# Patient Record
Sex: FEMALE | URBAN - METROPOLITAN AREA
[De-identification: names, ages, dates, MRNs, and addresses within clinical notes are randomized per-mention and may not be internally consistent; named-entity substitution may affect disease eponyms.]

---

## 2017-01-18 NOTE — PATIENT DISCUSSION
(Z96.1) Presence of intraocular lens - Assesment : Patient is Pseudophakic. ORA done in OR during surgery IOP OS SLIGHTLY ELEVATED  1 GTT TRAVATAN IN OFFICE @ 8:49 LW - Plan : Discussed signs and symptoms of infection and retinal detachments. Do not rub operated eye. Follow drop schedule If redness,pain,decreased vision, flashes or floaters occur then contact clinic.

## 2017-01-23 NOTE — PATIENT DISCUSSION
(Z96.1) Presence of intraocular lens - Assesment : Patient is Pseudophakic. - Plan : Signs and symptoms of infection and retinal detachment are outlined in your surgical packet. Do not rub operated eye. Follow drop schedule. If redness, pain, decreased vision, flashes or floaters occur then contact clinic.

## 2017-01-23 NOTE — PATIENT DISCUSSION
(H25.11) Age-related nuclear cataract, right eye - Assesment : Examination revealed cataract. Examination reveals significant senile nucleur cataract, patient is symptomatic with visual function affected. Astigmatism present in both eyes recommend astigmatism correcting lens to correct the astigmatism if she wants less dependency on glasses,if doesn't proceed with toric then she'll have to wear glasses full-time after CE. Advised patient she'll will notice more improvement after Ce more on OS than OD secondary to WET amd OD. - Plan : Risks, Benefits and Alternatives were discussed with patient at length for Cataract Surgery. Visual symptoms are consistent with Cataract findings on examination and current refraction no longer provides satisfactory vision. Patient understands and desires surgery. All questions answered. Risks, Benefits and Alternatives discussed at length for IOL placement. Doctor suggests [place]. Patient TBD. Patient will need to wear glasses for best corrected vision for distance, near and to correct astigmatism if no toric/if toric reading glasses.   EYE:OD IOL TYPE: Toric POST OPERATIVE TARGET: [employer] PACKAGE: Toric

## 2017-02-01 NOTE — PATIENT DISCUSSION
(Z96.1) Presence of intraocular lens - Assesment : Patient is Pseudophakic. ORA was done in OR on operated eye. IOP OD ELEVATED TODAY  1 GTT TRAVATAN  @ 1:36  OD -LW - Plan : Discussed signs and symptoms of infection and retinal detachments. Do not rub operated eye. Follow drop schedule If redness,pain,decreased vision, flashes or floaters occur then contact clinic. START TRAVATAN OD QD X 4 DAYS THEN STOP -SAMPLE TO PT. POST OP GTT INSTRUCTIONS DISCUSSED WITH DEMI BEFORE PATIENT LEFT TODAY.

## 2017-02-07 NOTE — PATIENT DISCUSSION
(Z96.1) Presence of intraocular lens - Assesment : Patient is Pseudophakic. LENS EPITHELIAL CELL - Plan : Signs and symptoms of infection and retinal detachment are outlined in your surgical packet. Do not rub operated eye. Follow drop schedule. If redness, pain, decreased vision, flashes or floaters occur then contact clinic.  **JRB TO CONTINUE DUREZOL BEYOND GTT SCHEDULE.  1 WEEK /REFRACT/  DILATE OU

## 2017-02-14 NOTE — PATIENT DISCUSSION
(Z96.1) Presence of intraocular lens - Assesment : Patient is Pseudophakic. - Plan : Signs and symptoms of infection and retinal detachment are outlined in your surgical packet. Do not rub operated eye. Follow drop schedule. If redness, pain, decreased vision, flashes or floaters occur then contact clinic. CONTINUE PROLENSA OR ILEVRO OU QD TILL NEXT VISIT.  -SAMPLES TO PT TODAY  1 MONTH/ REFRACT/ MAC OCT PER WILLIAM

## 2017-03-17 NOTE — PATIENT DISCUSSION
(X66.1237) Nexdtve age-related mclr degn left eye intermed dry stage - Assesment : Examination revealed AMD Dry PATIENT HAS APPT WITH DR. Chris Galvan NEXT WEEK - Plan : Monitor for changes. Advised patient to call our office with decreased vision or increased distortion. Patient advised to check [employer] regularly (once weekly or more) and use nutraceuticals such as AREDS 2 eye vitamins. Wear sunglasses when outdoors and eat green, leafy vegetables to maintain ocular health.

## 2017-03-17 NOTE — PATIENT DISCUSSION
(C97.3727) Exdtve age-rel mclr degn right eye with inact chrdl neovas - Assesment : Examination revealed AMD Wet. PATIENT HAS APPT WITH DR. Brunilda Anguiano NEXT WEEK. - Plan : Monitor for changes. Advised patient to call our office with decreased vision or increased distortion. FOLLOW UP WITH DR. Brunilda Anguiano AS SCHEDULED.

## 2017-03-17 NOTE — PATIENT DISCUSSION
(D02.5857) Primary open-angle glaucoma bilateral mild stage - Assesment : Examination revealed primary open angle glaucoma. -MILD - Plan : Monitor for changes. Continue Latanoprost OU qhs.  2 MONTH/ 24-2/ TENSION CHECK WITH DR. PARMAR

## 2017-03-17 NOTE — PATIENT DISCUSSION
(Z96.1) Presence of intraocular lens - Assesment : Patient is Pseudophakic. - Plan : Signs and symptoms of infection and retinal detachment are outlined in your surgical packet. Do not rub operated eye. Follow drop schedule. If redness, pain, decreased vision, flashes or floaters occur then contact clinic. USE PROLENSA OU QD TILL BOTTLE IS GONE.

## 2017-05-19 NOTE — PATIENT DISCUSSION
(V82.988) Keratoconjunct sicca, not specified as Sjogren's, bilateral - Assesment : Examination revealed Dry Eye Syndrome - Plan : Monitor for changes. ATs daily 2-3 times and prn.

## 2017-05-19 NOTE — PATIENT DISCUSSION
(H42.2565) Primary open-angle glaucoma bilateral mild stage - Assesment : Examination revealed primary open angle glaucoma. HVF today. - Plan : Monitor for IOP and nfl changes with visits and testing. Continue Latanoprost OU qhs. E-Rx with refills sent to pharmacy. Continue following with Dr. Randall Rogers as scheduled for Retina. RTC in 6 months for Exam and OCT ONH, sooner if problems or changes occur.

## 2017-11-16 NOTE — PATIENT DISCUSSION
(O92.6933) Primary open-angle glaucoma bilateral mild stage - Assesment : Examination revealed primary open angle glaucoma. OCT ONH stable today. - Plan : Monitor for IOP and NFL changes with visits and testing. Continue Latanoprost OU qhs. Continue following with Dr. Joey Galeano as scheduled for Retina. RTC in 6 months for IOP Check and HVF, sooner if problems or changes occur.

## 2017-11-16 NOTE — PATIENT DISCUSSION
(H41.384) Vitreous degeneration, left eye - Assesment : Examination revealed PVD OS. No changes reported. No holes or tears noted today. - Plan : Monitor for changes. Advised patient to call our office with decreased vision or an increase in flashes and/or floaters.

## 2017-11-16 NOTE — PATIENT DISCUSSION
(R50.6455) Exdtve age-rel mclr degn right eye with inact chrdl neovas - Assesment : Examination revealed AMD Wet. - Plan : Monitor for changes. Continue following with Dr. Joey Galeano as scheduled.

## 2017-11-16 NOTE — PATIENT DISCUSSION
(N97.365) Keratoconjunct sicca, not specified as Sjogren's, bilateral - Assesment : Examination revealed Dry Eye Syndrome - Plan : Monitor for changes. ATs daily 2-3 times and prn.

## 2018-02-08 ENCOUNTER — IMPORTED ENCOUNTER (OUTPATIENT)
Dept: URBAN - METROPOLITAN AREA CLINIC 9 | Facility: CLINIC | Age: 53
End: 2018-02-08

## 2019-12-10 ENCOUNTER — APPOINTMENT (OUTPATIENT)
Dept: CT IMAGING | Age: 54
End: 2019-12-10
Attending: EMERGENCY MEDICINE
Payer: COMMERCIAL

## 2019-12-10 ENCOUNTER — HOSPITAL ENCOUNTER (EMERGENCY)
Age: 54
Discharge: HOME | End: 2019-12-10
Attending: EMERGENCY MEDICINE
Payer: COMMERCIAL

## 2019-12-10 VITALS
DIASTOLIC BLOOD PRESSURE: 91 MMHG | SYSTOLIC BLOOD PRESSURE: 127 MMHG | RESPIRATION RATE: 14 BRPM | WEIGHT: 144.4 LBS | HEIGHT: 68 IN | BODY MASS INDEX: 21.89 KG/M2 | OXYGEN SATURATION: 98 % | HEART RATE: 64 BPM | TEMPERATURE: 97.3 F

## 2019-12-10 DIAGNOSIS — M47.812 CERVICAL SPONDYLOSIS: Primary | ICD-10-CM

## 2019-12-10 PROCEDURE — 72125 CT NECK SPINE W/O DYE: CPT

## 2019-12-10 PROCEDURE — 99284 EMERGENCY DEPT VISIT MOD MDM: CPT

## 2019-12-10 PROCEDURE — 72128 CT CHEST SPINE W/O DYE: CPT

## 2019-12-10 PROCEDURE — 74011250637 HC RX REV CODE- 250/637: Performed by: EMERGENCY MEDICINE

## 2019-12-10 PROCEDURE — 93005 ELECTROCARDIOGRAM TRACING: CPT

## 2019-12-10 RX ORDER — PREDNISONE 20 MG/1
20 TABLET ORAL
COMMUNITY

## 2019-12-10 RX ORDER — SPIRONOLACTONE 50 MG/1
50 TABLET, FILM COATED ORAL DAILY
COMMUNITY

## 2019-12-10 RX ORDER — HYDROCODONE BITARTRATE AND ACETAMINOPHEN 5; 325 MG/1; MG/1
1 TABLET ORAL
Status: COMPLETED | OUTPATIENT
Start: 2019-12-10 | End: 2019-12-10

## 2019-12-10 RX ORDER — CYCLOBENZAPRINE HCL 5 MG
5 TABLET ORAL
Qty: 15 TAB | Refills: 0 | Status: SHIPPED | OUTPATIENT
Start: 2019-12-10

## 2019-12-10 RX ORDER — LEVOTHYROXINE SODIUM 50 UG/1
50 TABLET ORAL
COMMUNITY

## 2019-12-10 RX ORDER — GABAPENTIN 100 MG/1
100 CAPSULE ORAL 3 TIMES DAILY
Qty: 20 CAP | Refills: 0 | Status: SHIPPED | OUTPATIENT
Start: 2019-12-10

## 2019-12-10 RX ORDER — LORATADINE 10 MG/1
10 TABLET ORAL
COMMUNITY

## 2019-12-10 RX ORDER — DIAZEPAM 5 MG/1
5 TABLET ORAL ONCE
Status: COMPLETED | OUTPATIENT
Start: 2019-12-10 | End: 2019-12-10

## 2019-12-10 RX ORDER — ESOMEPRAZOLE MAGNESIUM 40 MG/1
40 CAPSULE, DELAYED RELEASE ORAL DAILY
COMMUNITY

## 2019-12-10 RX ORDER — FLUTICASONE PROPIONATE 50 MCG
2 SPRAY, SUSPENSION (ML) NASAL DAILY
COMMUNITY

## 2019-12-10 RX ADMIN — DIAZEPAM 5 MG: 5 TABLET ORAL at 18:20

## 2019-12-10 RX ADMIN — HYDROCODONE BITARTRATE AND ACETAMINOPHEN 1 TABLET: 5; 325 TABLET ORAL at 19:26

## 2019-12-10 NOTE — ED PROVIDER NOTES
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 She denies actual pain in the neck but the pain in the paraspinal area around the T-spine makes it very difficult for her to rotate her head. She has no wrist drop or decreased in motor function. Past Medical History:   Diagnosis Date    Adrenal insufficiency (HCC)     Allergic rhinitis     Arthritis     Asthma     Diverticulitis     Elevated liver enzymes     Hypercholesteremia     Hypothyroidism     Migraines     Prediabetes     Psoriatic arthritis ([facility] [address]        Past Surgical History:   Procedure Laterality Date    HX CATARACT REMOVAL      HX GI      sigmoid resection    HX HERNIA REPAIR      HX HYSTERECTOMY      HX SACROCOLPOPEXY      HX WISDOM TEETH EXTRACTION      VASCULAR SURGERY PROCEDURE UNLIST      left greater saphenous vein closure         History reviewed. No pertinent family history.     Social History     Socioeconomic History    Marital status: Not on file     Spouse name: Not on file    Number of children: Not on file    Years of education: Not on file    Highest education level: Not on file   Occupational History    Not on file   Social Needs    Financial resource strain: Not on file    Food insecurity:     Worry: Not on file     Inability: Not on file    Transportation needs:     Medical: Not on file     Non-medical: Not on file   Tobacco Use    Smoking status: Never Smoker    Smokeless tobacco: Never Used   Substance and Sexual Activity    Alcohol use: Not Currently    Drug use: Never    Sexual activity: Not on file   Lifestyle    Physical activity:     Days per week: Not on file     Minutes per session: Not on file    Stress: Not on file Relationships    Social connections:     Talks on phone: Not on file     Gets together: Not on file     Attends Bahai service: Not on file     Active member of club or organization: Not on file     Attends meetings of clubs or organizations: Not on file     Relationship status: Not on file    Intimate partner violence:     Fear of current or ex partner: Not on file     Emotionally abused: Not on file     Physically abused: Not on file     Forced sexual activity: Not on file   Other Topics Concern    Not on file   Social History Narrative    Not on file         ALLERGIES: Pcn [penicillins]    Review of Systems   All other systems reviewed and are negative. Vitals:    12/10/19 1731   BP: (!) 122/94   Pulse: 86   Resp: 16   Temp: 98.5 °F (36.9 °C)   SpO2: 97%   Weight: 65.5 kg (144 lb 6.4 oz)   Height: 5' 8\" (1.727 m)            Physical Exam  Vitals signs and nursing note reviewed. Constitutional:       Appearance: She is well-developed. HENT:      Head: Normocephalic and atraumatic. Right Ear: Tympanic membrane normal.      Mouth/Throat:      Pharynx: No oropharyngeal exudate. Eyes:      General: No scleral icterus. Conjunctiva/sclera: Conjunctivae normal.   Neck:      Musculoskeletal: Neck supple. Thyroid: No thyromegaly. Cardiovascular:      Rate and Rhythm: Normal rate and regular rhythm. Heart sounds: Normal heart sounds. No murmur. No friction rub. No gallop. Pulmonary:      Effort: Pulmonary effort is normal. No respiratory distress. Breath sounds: Normal breath sounds. No stridor. No wheezing or rales. Abdominal:      General: Bowel sounds are normal.      Palpations: Abdomen is soft. Tenderness: There is no tenderness. There is no guarding or rebound. Musculoskeletal: Normal range of motion. Lymphadenopathy:      Cervical: No cervical adenopathy. Skin:     General: Skin is warm and dry.    Neurological:      Mental Status: She is alert and oriented to person, place, and time. MDM  Number of Diagnoses or Management Options  Cervical spondylosis:      Amount and/or Complexity of Data Reviewed  Tests in the radiology section of CPT®: ordered and reviewed  Obtain history from someone other than the patient: yes           Procedures        Assessment and plan    the patient has cervical spondylosis especially at C5-6 worse on the left but no acute herniated disc. I believe this is accounting for her left arm pain and she probably has some spasm in the paraspinal area of the T-spine. T-spine CT scan revealed no acute findings. I will treat the patient with Neurontin and Flexeril and have asked her to increase her prednisone to 60 mg a day for a few days with close follow-up by Dr. Alan Boyle. a the patient may need an MR scan in the future.

## 2019-12-10 NOTE — ED TRIAGE NOTES
Pt states that she has psoriatic arthritis and today her pain in her neck, left arm, and left shoulder. Per  pt has pin-point tenderness in her back and patient states it hurts to move.

## 2019-12-11 LAB
ATRIAL RATE: 74 BPM
CALCULATED P AXIS, ECG09: 42 DEGREES
CALCULATED R AXIS, ECG10: 29 DEGREES
CALCULATED T AXIS, ECG11: 42 DEGREES
DIAGNOSIS, 93000: NORMAL
P-R INTERVAL, ECG05: 136 MS
Q-T INTERVAL, ECG07: 362 MS
QRS DURATION, ECG06: 84 MS
QTC CALCULATION (BEZET), ECG08: 401 MS
VENTRICULAR RATE, ECG03: 74 BPM

## 2019-12-11 NOTE — DISCHARGE INSTRUCTIONS
Patient Education        Cervical Spondylosis: Care Instructions  Your Care Instructions    Cervical spondylosis is a type of arthritis of the neck. It can happen as people get older. It may be caused by bone spurs or other problems. You may have neck pain and stiffness. Sometimes the space around the spinal cord narrows. When this happens, it is called spinal stenosis. Spinal stenosis can cause pain, numbness, or weakness in the arms, legs, feet, and rear end (buttocks). It can also cause loss of bowel and bladder control. You can treat some of your symptoms with over-the-counter pain medicine. But if you have spinal stenosis with severe symptoms, you may need surgery. Follow-up care is a key part of your treatment and safety. Be sure to make and go to all appointments, and call your doctor if you are having problems. It's also a good idea to know your test results and keep a list of the medicines you take. How can you care for yourself at home? · Take anti-inflammatory medicines to reduce neck pain. These include ibuprofen (Advil, Motrin) and naproxen (Aleve). Be safe with medicines. Read and follow all instructions on the label. · Follow your doctor's recommendation about activity. He or she may tell you not to do sports or activities that could injure your neck. When should you call for help? Call 911 anytime you think you may need emergency care. For example, call if:    · You are unable to move an arm or a leg at all.   [facility] your doctor now or seek immediate medical care if:    · You have new or worse symptoms in your arms, legs, belly, or buttocks. Symptoms may include:  ? Numbness or tingling. ? Weakness. ? Pain.     · You lose bladder or bowel control.    Watch closely for changes in your health, and be sure to contact your doctor if:    · You do not get better as expected. Where can you learn more? Go to http://sneha-neil.info/.   Enter G782 in the search box to learn more about \"Cervical Spondylosis: Care Instructions. \"  Current as of: June 26, 2019  Content Version: 12.2  © 5098-6495 [employer]. Care instructions adapted under license by [employer] (which disclaims liability or warranty for this information). If you have questions about a medical condition or this instruction, always ask your healthcare professional. [address] any warranty or liability for your use of this information.

## 2019-12-11 NOTE — ED NOTES
Patient given copy of dc instructions and 2 paper script. Patient  verbalized understanding of instructions and script. Patient given a current medication reconciliation form and verbalized understanding of their medications. Patientverbalized understanding of the importance of discussing medications with  his or her physician or clinic they will be following up with. Patient alert and oriented and in no acute distress. Patient ambulatory to exit with family member.

## 2021-03-23 ENCOUNTER — IMPORTED ENCOUNTER (OUTPATIENT)
Dept: URBAN - METROPOLITAN AREA CLINIC 9 | Facility: CLINIC | Age: 56
End: 2021-03-23

## 2021-03-23 PROBLEM — H04.123: Noted: 2021-03-23

## 2021-03-23 PROBLEM — E11.9: Noted: 2021-03-23

## 2021-03-23 PROBLEM — H52.223: Noted: 2021-03-23

## 2021-03-23 PROBLEM — H35.373: Noted: 2021-03-23

## 2021-03-23 PROBLEM — M35.00: Noted: 2021-03-23

## 2021-03-23 PROBLEM — H43.813: Noted: 2021-03-23

## 2021-03-23 PROBLEM — H11.153: Noted: 2021-03-23

## 2021-03-23 PROBLEM — H35.371: Noted: 2021-03-23

## 2021-10-16 ASSESSMENT — TONOMETRY
OD_IOP_MMHG: 16
OD_IOP_MMHG: 15
OS_IOP_MMHG: 17
OS_IOP_MMHG: 18

## 2021-10-16 ASSESSMENT — VISUAL ACUITY
OS_CC: 20/25 SN
OS_CC: 20/30 -2 SN
OD_CC: 20/20 SN
OD_CC: 20/20 SN
OS_CC: 20/20 -2 SN
OS_SC: 20/200 SN
OD_SC: 20/40 - SN

## 2021-10-16 ASSESSMENT — KERATOMETRY
OS_K1POWER_DIOPTERS: 44.75
OD_AXISANGLE2_DEGREES: 117
OS_K2POWER_DIOPTERS: 41.75
OS_AXISANGLE2_DEGREES: 56
OS_AXISANGLE_DEGREES: 146
OD_K2POWER_DIOPTERS: 43
OD_K1POWER_DIOPTERS: 43.5
OD_AXISANGLE_DEGREES: 27

## 2022-11-14 ENCOUNTER — ESTABLISHED PATIENT (OUTPATIENT)
Dept: URBAN - METROPOLITAN AREA CLINIC 4 | Facility: CLINIC | Age: 57
End: 2022-11-14

## 2022-11-14 DIAGNOSIS — H35.371: ICD-10-CM

## 2022-11-14 DIAGNOSIS — H43.813: ICD-10-CM

## 2022-11-14 DIAGNOSIS — E11.9: ICD-10-CM

## 2022-11-14 PROCEDURE — 92014 COMPRE OPH EXAM EST PT 1/>: CPT

## 2022-11-14 PROCEDURE — 92134 CPTRZ OPH DX IMG PST SGM RTA: CPT

## 2022-11-14 ASSESSMENT — TONOMETRY
OS_IOP_MMHG: 28
OD_IOP_MMHG: 17

## 2022-11-14 ASSESSMENT — VISUAL ACUITY
OS_CC: 20/25+1
OD_CC: 20/20

## 2022-12-05 ENCOUNTER — ESTABLISHED PATIENT (OUTPATIENT)
Dept: URBAN - METROPOLITAN AREA CLINIC 17 | Facility: CLINIC | Age: 57
End: 2022-12-05

## 2022-12-05 PROCEDURE — 92134 CPTRZ OPH DX IMG PST SGM RTA: CPT

## 2022-12-05 PROCEDURE — 99214 OFFICE O/P EST MOD 30 MIN: CPT

## 2022-12-05 RX ORDER — NEOMYCIN SULFATE, POLYMYXIN B SULFATE AND DEXAMETHASONE 3.5; 10000; 1 MG/G; [USP'U]/G; MG/G: OINTMENT OPHTHALMIC EVERY EVENING

## 2022-12-05 ASSESSMENT — VISUAL ACUITY
OD_CC: 20/30-1
OS_CC: 20/25-1

## 2022-12-05 ASSESSMENT — TONOMETRY
OD_IOP_MMHG: 18
OS_IOP_MMHG: 21

## 2023-04-12 ENCOUNTER — ESTABLISHED PATIENT (OUTPATIENT)
Dept: URBAN - METROPOLITAN AREA CLINIC 17 | Facility: CLINIC | Age: 58
End: 2023-04-12

## 2023-04-12 DIAGNOSIS — H04.123: ICD-10-CM

## 2023-04-12 DIAGNOSIS — H40.1111: ICD-10-CM

## 2023-04-12 PROCEDURE — 99213 OFFICE O/P EST LOW 20 MIN: CPT

## 2023-04-12 ASSESSMENT — VISUAL ACUITY
OD_CC: 20/50-2
OS_CC: 20/20

## 2023-04-12 ASSESSMENT — TONOMETRY
OS_IOP_MMHG: 16
OD_IOP_MMHG: 14

## 2023-08-02 ENCOUNTER — FOLLOW UP (OUTPATIENT)
Dept: URBAN - METROPOLITAN AREA CLINIC 17 | Facility: CLINIC | Age: 58
End: 2023-08-02

## 2023-08-02 DIAGNOSIS — M35.00: ICD-10-CM

## 2023-08-02 DIAGNOSIS — H40.1111: ICD-10-CM

## 2023-08-02 PROCEDURE — 99213 OFFICE O/P EST LOW 20 MIN: CPT

## 2023-08-02 PROCEDURE — 92133 CPTRZD OPH DX IMG PST SGM ON: CPT

## 2023-08-02 ASSESSMENT — TONOMETRY
OS_IOP_MMHG: 18
OD_IOP_MMHG: 14

## 2023-08-02 ASSESSMENT — VISUAL ACUITY
OD_CC: 20/80+1
OS_CC: 20/20-2

## 2023-09-26 ENCOUNTER — ESTABLISHED PATIENT (OUTPATIENT)
Dept: URBAN - METROPOLITAN AREA CLINIC 18 | Facility: CLINIC | Age: 58
End: 2023-09-26

## 2023-09-26 DIAGNOSIS — H35.373: ICD-10-CM

## 2023-09-26 DIAGNOSIS — H40.1111: ICD-10-CM

## 2023-09-26 DIAGNOSIS — E11.9: ICD-10-CM

## 2023-09-26 DIAGNOSIS — H43.813: ICD-10-CM

## 2023-09-26 PROCEDURE — 92014 COMPRE OPH EXAM EST PT 1/>: CPT

## 2023-09-26 PROCEDURE — 92201 OPSCPY EXTND RTA DRAW UNI/BI: CPT

## 2023-09-26 PROCEDURE — 92134 CPTRZ OPH DX IMG PST SGM RTA: CPT

## 2023-09-26 ASSESSMENT — VISUAL ACUITY
OD_CC: 20/70-2
OS_CC: 20/20-2

## 2023-09-26 ASSESSMENT — TONOMETRY
OD_IOP_MMHG: 13
OS_IOP_MMHG: 14

## 2024-02-21 ENCOUNTER — ESTABLISHED PATIENT (OUTPATIENT)
Dept: URBAN - METROPOLITAN AREA CLINIC 17 | Facility: CLINIC | Age: 59
End: 2024-02-21

## 2024-02-21 DIAGNOSIS — E11.9: ICD-10-CM

## 2024-02-21 DIAGNOSIS — H43.813: ICD-10-CM

## 2024-02-21 DIAGNOSIS — H35.373: ICD-10-CM

## 2024-02-21 DIAGNOSIS — H52.4: ICD-10-CM

## 2024-02-21 DIAGNOSIS — H40.1111: ICD-10-CM

## 2024-02-21 DIAGNOSIS — H16.223: ICD-10-CM

## 2024-02-21 PROCEDURE — 92015 DETERMINE REFRACTIVE STATE: CPT

## 2024-02-21 PROCEDURE — 99214 OFFICE O/P EST MOD 30 MIN: CPT

## 2024-02-21 PROCEDURE — 92134 CPTRZ OPH DX IMG PST SGM RTA: CPT

## 2024-02-21 ASSESSMENT — TONOMETRY
OD_IOP_MMHG: 16
OS_IOP_MMHG: 16

## 2024-02-21 ASSESSMENT — VISUAL ACUITY
OS_CC: 20/25-1
OD_CC: 20/40

## 2024-04-29 ENCOUNTER — ESTABLISHED PATIENT (OUTPATIENT)
Dept: URBAN - METROPOLITAN AREA CLINIC 17 | Facility: CLINIC | Age: 59
End: 2024-04-29

## 2024-04-29 DIAGNOSIS — H35.373: ICD-10-CM

## 2024-04-29 DIAGNOSIS — H40.1111: ICD-10-CM

## 2024-04-29 DIAGNOSIS — H16.223: ICD-10-CM

## 2024-04-29 DIAGNOSIS — H52.4: ICD-10-CM

## 2024-04-29 PROCEDURE — 99213 OFFICE O/P EST LOW 20 MIN: CPT

## 2024-04-29 ASSESSMENT — TONOMETRY
OD_IOP_MMHG: 17
OS_IOP_MMHG: 16

## 2024-04-29 ASSESSMENT — VISUAL ACUITY: OU_CC: J7

## 2024-11-05 ENCOUNTER — FOLLOW UP (OUTPATIENT)
Dept: URBAN - METROPOLITAN AREA CLINIC 17 | Facility: CLINIC | Age: 59
End: 2024-11-05

## 2024-11-05 DIAGNOSIS — H52.4: ICD-10-CM

## 2024-11-05 DIAGNOSIS — H16.223: ICD-10-CM

## 2024-11-05 DIAGNOSIS — H35.373: ICD-10-CM

## 2024-11-05 DIAGNOSIS — H40.1111: ICD-10-CM

## 2024-11-05 PROCEDURE — 99213 OFFICE O/P EST LOW 20 MIN: CPT

## 2025-06-02 ENCOUNTER — COMPREHENSIVE EXAM (OUTPATIENT)
Age: 60
End: 2025-06-02

## 2025-06-02 DIAGNOSIS — H52.4: ICD-10-CM

## 2025-06-02 DIAGNOSIS — H35.373: ICD-10-CM

## 2025-06-02 DIAGNOSIS — H16.223: ICD-10-CM

## 2025-06-02 DIAGNOSIS — E11.9: ICD-10-CM

## 2025-06-02 DIAGNOSIS — H40.1111: ICD-10-CM

## 2025-06-02 PROCEDURE — 92134 CPTRZ OPH DX IMG PST SGM RTA: CPT

## 2025-06-02 PROCEDURE — 99214 OFFICE O/P EST MOD 30 MIN: CPT
